# Patient Record
Sex: MALE | URBAN - METROPOLITAN AREA
[De-identification: names, ages, dates, MRNs, and addresses within clinical notes are randomized per-mention and may not be internally consistent; named-entity substitution may affect disease eponyms.]

---

## 2023-07-06 ENCOUNTER — OFFICE VISIT (OUTPATIENT)
Dept: URBAN - METROPOLITAN AREA CLINIC 74 | Facility: CLINIC | Age: 53
End: 2023-07-06
Payer: COMMERCIAL

## 2023-07-06 ENCOUNTER — LAB OUTSIDE AN ENCOUNTER (OUTPATIENT)
Dept: URBAN - METROPOLITAN AREA CLINIC 74 | Facility: CLINIC | Age: 53
End: 2023-07-06

## 2023-07-06 ENCOUNTER — WEB ENCOUNTER (OUTPATIENT)
Dept: URBAN - METROPOLITAN AREA CLINIC 74 | Facility: CLINIC | Age: 53
End: 2023-07-06

## 2023-07-06 VITALS
TEMPERATURE: 97.2 F | BODY MASS INDEX: 42.59 KG/M2 | SYSTOLIC BLOOD PRESSURE: 118 MMHG | HEIGHT: 66 IN | WEIGHT: 265 LBS | HEART RATE: 111 BPM | DIASTOLIC BLOOD PRESSURE: 80 MMHG

## 2023-07-06 DIAGNOSIS — K21.9 GERD: ICD-10-CM

## 2023-07-06 DIAGNOSIS — K31.9 GASTROPATHY: ICD-10-CM

## 2023-07-06 DIAGNOSIS — K44.9 HIATAL HERNIA: ICD-10-CM

## 2023-07-06 DIAGNOSIS — Z86.010 PERSONAL HISTORY OF COLONIC POLYPS: ICD-10-CM

## 2023-07-06 DIAGNOSIS — K57.30 SIGMOID DIVERTICULOSIS: ICD-10-CM

## 2023-07-06 DIAGNOSIS — K76.0 FATTY LIVER: ICD-10-CM

## 2023-07-06 DIAGNOSIS — E66.9 OBESITY (BMI 30-39.9): ICD-10-CM

## 2023-07-06 PROBLEM — 428283002: Status: ACTIVE | Noted: 2023-07-06

## 2023-07-06 PROBLEM — 197321007: Status: ACTIVE | Noted: 2023-07-06

## 2023-07-06 PROCEDURE — 99204 OFFICE O/P NEW MOD 45 MIN: CPT | Performed by: INTERNAL MEDICINE

## 2023-07-06 RX ORDER — FAMOTIDINE 20 MG/1
1 TABLET AT BEDTIME AS NEEDED TABLET, FILM COATED ORAL ONCE A DAY
Status: ACTIVE | COMMUNITY

## 2023-07-06 RX ORDER — AMLODIPINE BESYLATE 5 MG/1
TAKE 1 TABLET (5 MG) BY ORAL ROUTE ONCE DAILY TABLET ORAL 1
Qty: 0 | Refills: 0 | Status: ACTIVE | COMMUNITY
Start: 1900-01-01

## 2023-07-06 RX ORDER — LANSOPRAZOLE 30 MG/1
TAKE 1 CAPSULE (30 MG) BY ORAL ROUTE ONCE DAILY BEFORE A MEAL CAPSULE, DELAYED RELEASE ORAL 1
OUTPATIENT

## 2023-07-06 RX ORDER — LANSOPRAZOLE 30 MG/1
TAKE 1 CAPSULE (30 MG) BY ORAL ROUTE ONCE DAILY BEFORE A MEAL CAPSULE, DELAYED RELEASE ORAL 1
Qty: 0 | Refills: 0 | Status: ACTIVE | COMMUNITY
Start: 1900-01-01

## 2023-07-06 RX ORDER — LISINOPRIL 20 MG/1
TAKE 1 TABLET (20 MG) BY ORAL ROUTE ONCE DAILY TABLET ORAL 1
Qty: 0 | Refills: 0 | Status: ACTIVE | COMMUNITY
Start: 1900-01-01

## 2023-07-06 RX ORDER — FAMOTIDINE 20 MG/1
1 TABLET AT BEDTIME AS NEEDED TABLET, FILM COATED ORAL ONCE A DAY
OUTPATIENT

## 2023-07-06 NOTE — PHYSICAL EXAM CONSTITUTIONAL:
Morbidly obese, well-developed, well nourished, in no acute distress, ambulating without difficulty, normal communication ability

## 2023-07-06 NOTE — HPI-TODAY'S VISIT:
Today July 6, 2023 the patient returns for a visit, the patient was last seen on March 7, 2019 with a history of adenomatous colonic polyps, sigmoid diverticulosis, internal hemorrhoids, a hiatal hernia, gastropathy, obesity.  At the time of the visit the patient returned for a follow-up visit after a colonoscopy which revealed a 9 mm adenomatous sigmoid sessile polyp removed with a hot snare, sigmoid diverticulosis and internal hemorrhoids, the terminal ileum was normal.  The EGD revealed normal esophageal mucosa a small hiatal hernia, reactive gastropathy and normal duodenal mucosa.  At the time the patient had a C-reactive protein of 26.1, stools were not obtained.  The patient appeared to be doing better, he reported normal bowel movements and no abdominal pain, there was no nausea, vomiting, there was less heartburn on Prevacid 15 mg daily.  The patient was advised to follow antireflux measures and diet, ingest a high-fiber diet, lose weight, and return for a surveillance colonoscopy in 5-years.  A CT scan performed on January 7, 2019 was abnormal including pelvic and abdominal fluid, ileitis and right colitis.  At the time the patient had an abnormal CBC with leukocytosis.  During the last colonoscopy biopsies were obtained from the terminal ileum which failed to show any abnormalities, the colonic mucosa was normal besides the adenomatous polyp.  Today the patient returns to the office stating that he has continued to have chronic acid reflux over the years, over the years he made changes on his PPI from over-the-counter Prilosec to Prevacid and back, lately after seeing ENT for cough and some throat irritation he was placed on lansoprazole 30 mg daily and famotidine 20 mg at bedtime.  He seems to be doing better on the combination.  The patient denies having any dysphagia, odynophagia, nausea, vomiting.  The patient is concerned about having an enlarging hiatal hernia and esophageal changes related to acid reflux such as Alonso's.  The patient will be scheduled to have an EGD.  Benefits potential complications and alternatives to EGD were disclosed.  The patient will follow antireflux measures and diet.  Currently the patient is having normal bowel movements, denies having any diarrhea, constipation, rectal bleeding or hematochezia.  The patient requests to have a colonoscopy in view of the previous history of adenomatous colonic polyps.  The patient will be scheduled for the colonoscopy.  Benefits potential complications and alternatives to colonoscopy were disclosed.  The patient is morbidly obese, we discussed fatty liver and potential consequences related to fatty liver.  Recent laboratory performed by primary care revealed a normal CBC and normal CMP including normal LFTs.  The patient agreed to get a right upper quadrant ultrasound.  The patient will return for follow-up visit after completion of testing.

## 2023-07-18 ENCOUNTER — OFFICE VISIT (OUTPATIENT)
Dept: URBAN - METROPOLITAN AREA CLINIC 73 | Facility: CLINIC | Age: 53
End: 2023-07-18
Payer: COMMERCIAL

## 2023-07-18 DIAGNOSIS — E66.9 OBESITY (BMI 30-39.9): ICD-10-CM

## 2023-07-18 DIAGNOSIS — K76.0 FATTY LIVER: ICD-10-CM

## 2023-07-18 PROCEDURE — 76705 ECHO EXAM OF ABDOMEN: CPT | Performed by: INTERNAL MEDICINE

## 2023-07-23 ENCOUNTER — WEB ENCOUNTER (OUTPATIENT)
Dept: URBAN - METROPOLITAN AREA CLINIC 74 | Facility: CLINIC | Age: 53
End: 2023-07-23

## 2023-08-07 ENCOUNTER — OFFICE VISIT (OUTPATIENT)
Dept: URBAN - METROPOLITAN AREA SURGERY CENTER 30 | Facility: SURGERY CENTER | Age: 53
End: 2023-08-07

## 2023-08-15 ENCOUNTER — CLAIMS CREATED FROM THE CLAIM WINDOW (OUTPATIENT)
Dept: URBAN - METROPOLITAN AREA CLINIC 4 | Facility: CLINIC | Age: 53
End: 2023-08-15
Payer: COMMERCIAL

## 2023-08-15 ENCOUNTER — OFFICE VISIT (OUTPATIENT)
Dept: URBAN - METROPOLITAN AREA SURGERY CENTER 30 | Facility: SURGERY CENTER | Age: 53
End: 2023-08-15
Payer: COMMERCIAL

## 2023-08-15 DIAGNOSIS — K21.9 GASTRO-ESOPHAGEAL REFLUX DISEASE WITHOUT ESOPHAGITIS: ICD-10-CM

## 2023-08-15 DIAGNOSIS — K21.9 ACID REFLUX: ICD-10-CM

## 2023-08-15 DIAGNOSIS — K29.60 ADENOPAPILLOMATOSIS GASTRICA: ICD-10-CM

## 2023-08-15 DIAGNOSIS — Z86.010 ADENOMAS PERSONAL HISTORY OF COLONIC POLYPS: ICD-10-CM

## 2023-08-15 DIAGNOSIS — K31.A0 GASTRIC INTESTINAL METAPLASIA, UNSPECIFIED: ICD-10-CM

## 2023-08-15 DIAGNOSIS — K63.5 BENIGN COLON POLYP: ICD-10-CM

## 2023-08-15 DIAGNOSIS — K22.2 ACQUIRED ESOPHAGEAL RING: ICD-10-CM

## 2023-08-15 PROCEDURE — G8907 PT DOC NO EVENTS ON DISCHARG: HCPCS | Performed by: INTERNAL MEDICINE

## 2023-08-15 PROCEDURE — 88312 SPECIAL STAINS GROUP 1: CPT | Performed by: PATHOLOGY

## 2023-08-15 PROCEDURE — 45385 COLONOSCOPY W/LESION REMOVAL: CPT | Performed by: INTERNAL MEDICINE

## 2023-08-15 PROCEDURE — 88305 TISSUE EXAM BY PATHOLOGIST: CPT | Performed by: PATHOLOGY

## 2023-08-15 PROCEDURE — 88342 IMHCHEM/IMCYTCHM 1ST ANTB: CPT | Performed by: PATHOLOGY

## 2023-08-15 PROCEDURE — 43239 EGD BIOPSY SINGLE/MULTIPLE: CPT | Performed by: INTERNAL MEDICINE

## 2023-08-15 RX ORDER — AMLODIPINE BESYLATE 5 MG/1
TAKE 1 TABLET (5 MG) BY ORAL ROUTE ONCE DAILY TABLET ORAL 1
Qty: 0 | Refills: 0 | Status: ACTIVE | COMMUNITY
Start: 1900-01-01

## 2023-08-15 RX ORDER — LANSOPRAZOLE 30 MG/1
TAKE 1 CAPSULE (30 MG) BY ORAL ROUTE ONCE DAILY BEFORE A MEAL CAPSULE, DELAYED RELEASE ORAL 1
Status: ACTIVE | COMMUNITY

## 2023-08-15 RX ORDER — FAMOTIDINE 20 MG/1
1 TABLET AT BEDTIME AS NEEDED TABLET, FILM COATED ORAL ONCE A DAY
Status: ACTIVE | COMMUNITY

## 2023-08-15 RX ORDER — LISINOPRIL 20 MG/1
TAKE 1 TABLET (20 MG) BY ORAL ROUTE ONCE DAILY TABLET ORAL 1
Qty: 0 | Refills: 0 | Status: ACTIVE | COMMUNITY
Start: 1900-01-01

## 2023-09-13 PROBLEM — 733657002: Status: ACTIVE | Noted: 2023-09-13

## 2023-09-20 ENCOUNTER — OFFICE VISIT (OUTPATIENT)
Dept: URBAN - METROPOLITAN AREA CLINIC 74 | Facility: CLINIC | Age: 53
End: 2023-09-20

## 2023-09-20 RX ORDER — LISINOPRIL 20 MG/1
TAKE 1 TABLET (20 MG) BY ORAL ROUTE ONCE DAILY TABLET ORAL 1
Qty: 0 | Refills: 0 | Status: ACTIVE | COMMUNITY
Start: 1900-01-01

## 2023-09-20 RX ORDER — FAMOTIDINE 20 MG/1
1 TABLET AT BEDTIME AS NEEDED TABLET, FILM COATED ORAL ONCE A DAY
OUTPATIENT

## 2023-09-20 RX ORDER — FAMOTIDINE 20 MG/1
1 TABLET AT BEDTIME AS NEEDED TABLET, FILM COATED ORAL ONCE A DAY
Status: ACTIVE | COMMUNITY

## 2023-09-20 RX ORDER — LANSOPRAZOLE 30 MG/1
TAKE 1 CAPSULE (30 MG) BY ORAL ROUTE ONCE DAILY BEFORE A MEAL CAPSULE, DELAYED RELEASE ORAL 1
OUTPATIENT

## 2023-09-20 RX ORDER — LANSOPRAZOLE 30 MG/1
TAKE 1 CAPSULE (30 MG) BY ORAL ROUTE ONCE DAILY BEFORE A MEAL CAPSULE, DELAYED RELEASE ORAL 1
Status: ACTIVE | COMMUNITY

## 2023-09-20 RX ORDER — AMLODIPINE BESYLATE 5 MG/1
TAKE 1 TABLET (5 MG) BY ORAL ROUTE ONCE DAILY TABLET ORAL 1
Qty: 0 | Refills: 0 | Status: ACTIVE | COMMUNITY
Start: 1900-01-01

## 2023-09-20 NOTE — HPI-TODAY'S VISIT:
Today July 6, 2023 the patient returns for a visit, the patient was last seen on March 7, 2019 with a history of adenomatous colonic polyps, sigmoid diverticulosis, internal hemorrhoids, a hiatal hernia, gastropathy, obesity.  At the time of the visit the patient returned for a follow-up visit after a colonoscopy which revealed a 9 mm adenomatous sigmoid sessile polyp removed with a hot snare, sigmoid diverticulosis and internal hemorrhoids, the terminal ileum was normal.  The EGD revealed normal esophageal mucosa a small hiatal hernia, reactive gastropathy and normal duodenal mucosa.  At the time the patient had a C-reactive protein of 26.1, stools were not obtained.  The patient appeared to be doing better, he reported normal bowel movements and no abdominal pain, there was no nausea, vomiting, there was less heartburn on Prevacid 15 mg daily.  The patient was advised to follow antireflux measures and diet, ingest a high-fiber diet, lose weight, and return for a surveillance colonoscopy in 5-years.  A CT scan performed on January 7, 2019 was abnormal including pelvic and abdominal fluid, ileitis and right colitis.  At the time the patient had an abnormal CBC with leukocytosis.  During the last colonoscopy biopsies were obtained from the terminal ileum which failed to show any abnormalities, the colonic mucosa was normal besides the adenomatous polyp.  Today the patient returns to the office stating that he has continued to have chronic acid reflux over the years, over the years he made changes on his PPI from over-the-counter Prilosec to Prevacid and back, lately after seeing ENT for cough and some throat irritation he was placed on lansoprazole 30 mg daily and famotidine 20 mg at bedtime.  He seems to be doing better on the combination.  The patient denies having any dysphagia, odynophagia, nausea, vomiting.  The patient is concerned about having an enlarging hiatal hernia and esophageal changes related to acid reflux such as Alonso's.  The patient will be scheduled to have an EGD.  Benefits potential complications and alternatives to EGD were disclosed.  The patient will follow antireflux measures and diet.  Currently the patient is having normal bowel movements, denies having any diarrhea, constipation, rectal bleeding or hematochezia.  The patient requests to have a colonoscopy in view of the previous history of adenomatous colonic polyps.  The patient will be scheduled for the colonoscopy.  Benefits potential complications and alternatives to colonoscopy were disclosed.  The patient is morbidly obese, we discussed fatty liver and potential consequences related to fatty liver.  Recent laboratory performed by primary care revealed a normal CBC and normal CMP including normal LFTs.  The patient agreed to get a right upper quadrant ultrasound.  The patient will return for follow-up visit after completion of testing.  Today September 20, 2023 the patient returns for a follow-up visit, the patient was last seen on July 6, 2023 with a personal history of colonic polyps, sigmoid diverticulosis, GERD, gastropathy, hiatal hernia, fatty liver and obesity.  At the time of the visit the patient did complain of chronic acid reflux, reflux has been present for a long.  May be years.  The patient made changes on PPIs from over-the-counter Prilosec to Prevacid and back, the patient had been seen by ENT for cough and some throat irritation and was placed on lansoprazole 30 mg daily and famotidine 20 mg at bedtime.  On the combination the patient appeared to be doing better.  The patient denied having any dysphagia, odynophagia, nausea or vomiting.  The patient was concerned about having an enlarging hiatal hernia and esophageal changes related to acid reflux such as Alonso's.  The patient requested to have an EGD.  Patient was advised to follow antireflux measures and diet.  The patient was having normal bowel movements denied having diarrhea constipation rectal bleeding or hematochezia.  The patient also requested to have a colonoscopy in view of the previous history of adenomatous colonic polyps.  The patient was morbidly obese, we discussed fatty liver and potential consequences related to fatty liver including cirrhosis and liver cancer.  Laboratory obtained by primary care revealed a normal CBC and normal CMP including normal LFTs.  The patient agreed to get a right upper quadrant ultrasound.  The right upper quadrant ultrasound was performed July 18, 2023 and it revealed increased liver echogenicity compatible with fatty liver, no focal hepatic lesions identified.  There was no evidence of any gallstones or gallbladder wall thickening, the common bile duct measured 3 mm.  The portion of the pancreas visualized was normal.  The right kidney was normal.  The EGD was performed on August 15, 2023 and it revealed a medium sized hiatal hernia, widely patent Schatzki's ring, chronic inactive gastritis with histological changes suggestive of treated H. pylori gastritis, no evidence of intestinal metaplasia dysplasia or malignancy, squamous esophageal mucosa revealed reflux type changes with no evidence of Alonso's.  The patient will be recommended to get an H. pylori breath test of PPIs.  A colonoscopy performed on the same day revealed a 5 mm mucosal polyp in the sigmoid colon removed with a hot snare, colonic diverticulosis involving the sigmoid and descending as well as ascending colon and nonbleeding internal hemorrhoids.  The patient was advised to get a colonoscopy in 5 years and follow a high-fiber diet.

## 2023-09-26 ENCOUNTER — DASHBOARD ENCOUNTERS (OUTPATIENT)
Age: 53
End: 2023-09-26

## 2023-09-28 ENCOUNTER — OFFICE VISIT (OUTPATIENT)
Dept: URBAN - METROPOLITAN AREA CLINIC 74 | Facility: CLINIC | Age: 53
End: 2023-09-28
Payer: COMMERCIAL

## 2023-09-28 VITALS
HEIGHT: 66 IN | BODY MASS INDEX: 41.95 KG/M2 | WEIGHT: 261 LBS | DIASTOLIC BLOOD PRESSURE: 82 MMHG | OXYGEN SATURATION: 97 % | SYSTOLIC BLOOD PRESSURE: 124 MMHG | TEMPERATURE: 97.5 F | HEART RATE: 101 BPM

## 2023-09-28 DIAGNOSIS — K57.30 COLON, DIVERTICULOSIS: ICD-10-CM

## 2023-09-28 DIAGNOSIS — Z86.010 PERSONAL HISTORY OF COLONIC POLYPS: ICD-10-CM

## 2023-09-28 DIAGNOSIS — K31.9 GASTROPATHY: ICD-10-CM

## 2023-09-28 DIAGNOSIS — K21.9 GERD: ICD-10-CM

## 2023-09-28 DIAGNOSIS — K44.9 HIATAL HERNIA: ICD-10-CM

## 2023-09-28 DIAGNOSIS — K76.0 FATTY LIVER: ICD-10-CM

## 2023-09-28 DIAGNOSIS — K64.8 INTERNAL HEMORRHOIDS: ICD-10-CM

## 2023-09-28 DIAGNOSIS — E66.9 OBESITY (BMI 30-39.9): ICD-10-CM

## 2023-09-28 PROCEDURE — 99213 OFFICE O/P EST LOW 20 MIN: CPT | Performed by: INTERNAL MEDICINE

## 2023-09-28 PROCEDURE — 91065 BREATH HYDROGEN/METHANE TEST: CPT | Performed by: INTERNAL MEDICINE

## 2023-09-28 RX ORDER — LISINOPRIL 20 MG/1
TAKE 1 TABLET (20 MG) BY ORAL ROUTE ONCE DAILY TABLET ORAL 1
Qty: 0 | Refills: 0 | Status: ACTIVE | COMMUNITY
Start: 1900-01-01

## 2023-09-28 RX ORDER — AMLODIPINE BESYLATE 5 MG/1
TAKE 1 TABLET (5 MG) BY ORAL ROUTE ONCE DAILY TABLET ORAL 1
Qty: 0 | Refills: 0 | Status: ACTIVE | COMMUNITY
Start: 1900-01-01

## 2023-09-28 RX ORDER — ATORVASTATIN CALCIUM 10 MG/1
TABLET ORAL
Qty: 90 TABLET | Status: ACTIVE | COMMUNITY

## 2023-09-28 RX ORDER — LANSOPRAZOLE 30 MG/1
TAKE 1 CAPSULE (30 MG) BY ORAL ROUTE ONCE DAILY BEFORE A MEAL CAPSULE, DELAYED RELEASE ORAL 1
Status: ACTIVE | COMMUNITY

## 2023-09-28 RX ORDER — FAMOTIDINE 20 MG/1
1 TABLET AT BEDTIME AS NEEDED TABLET, FILM COATED ORAL ONCE A DAY
Status: ACTIVE | COMMUNITY

## 2023-09-28 NOTE — HPI-TODAY'S VISIT:
Today July 6, 2023 the patient returns for a visit, the patient was last seen on March 7, 2019 with a history of adenomatous colonic polyps, sigmoid diverticulosis, internal hemorrhoids, a hiatal hernia, gastropathy, obesity.  At the time of the visit the patient returned for a follow-up visit after a colonoscopy which revealed a 9 mm adenomatous sigmoid sessile polyp removed with a hot snare, sigmoid diverticulosis and internal hemorrhoids, the terminal ileum was normal.  The EGD revealed normal esophageal mucosa a small hiatal hernia, reactive gastropathy and normal duodenal mucosa.  At the time the patient had a C-reactive protein of 26.1, stools were not obtained.  The patient appeared to be doing better, he reported normal bowel movements and no abdominal pain, there was no nausea, vomiting, there was less heartburn on Prevacid 15 mg daily.  The patient was advised to follow antireflux measures and diet, ingest a high-fiber diet, lose weight, and return for a surveillance colonoscopy in 5-years.  A CT scan performed on January 7, 2019 was abnormal including pelvic and abdominal fluid, ileitis and right colitis.  At the time the patient had an abnormal CBC with leukocytosis.  During the last colonoscopy biopsies were obtained from the terminal ileum which failed to show any abnormalities, the colonic mucosa was normal besides the adenomatous polyp.  Today the patient returns to the office stating that he has continued to have chronic acid reflux over the years, over the years he made changes on his PPI from over-the-counter Prilosec to Prevacid and back, lately after seeing ENT for cough and some throat irritation he was placed on lansoprazole 30 mg daily and famotidine 20 mg at bedtime.  He seems to be doing better on the combination.  The patient denies having any dysphagia, odynophagia, nausea, vomiting.  The patient is concerned about having an enlarging hiatal hernia and esophageal changes related to acid reflux such as Alonso's.  The patient will be scheduled to have an EGD.  Benefits potential complications and alternatives to EGD were disclosed.  The patient will follow antireflux measures and diet.  Currently the patient is having normal bowel movements, denies having any diarrhea, constipation, rectal bleeding or hematochezia.  The patient requests to have a colonoscopy in view of the previous history of adenomatous colonic polyps.  The patient will be scheduled for the colonoscopy.  Benefits potential complications and alternatives to colonoscopy were disclosed.  The patient is morbidly obese, we discussed fatty liver and potential consequences related to fatty liver.  Recent laboratory performed by primary care revealed a normal CBC and normal CMP including normal LFTs.  The patient agreed to get a right upper quadrant ultrasound.  The patient will return for follow-up visit after completion of testing.   Today September 28 , 2023 the patient returns for a follow-up visit, the patient was last seen on July 6, 2023 with a personal history of colonic polyps, sigmoid diverticulosis, GERD, gastropathy, hiatal hernia, fatty liver and obesity.  At the time of the visit the patient did complain of chronic acid reflux, reflux has been present for a long.  May be years.  The patient made changes on PPIs from over-the-counter Prilosec to Prevacid and back, the patient had been seen by ENT for cough and some throat irritation and was placed on lansoprazole 30 mg daily and famotidine 20 mg at bedtime.  On the combination the patient appeared to be doing better.  The patient denied having any dysphagia, odynophagia, nausea or vomiting.  The patient was concerned about having an enlarging hiatal hernia and esophageal changes related to acid reflux such as Alonso's.  The patient requested to have an EGD.  Patient was advised to follow antireflux measures and diet.  The patient was having normal bowel movements denied having diarrhea constipation rectal bleeding or hematochezia.  The patient also requested to have a colonoscopy in view of the previous history of adenomatous colonic polyps.  The patient was morbidly obese, we discussed fatty liver and potential consequences related to fatty liver including cirrhosis and liver cancer.  Laboratory obtained by primary care revealed a normal CBC and normal CMP including normal LFTs.  The patient agreed to get a right upper quadrant ultrasound.  The right upper quadrant ultrasound was performed July 18, 2023 and it revealed increased liver echogenicity compatible with fatty liver, no focal hepatic lesions identified.  There was no evidence of any gallstones or gallbladder wall thickening, the common bile duct measured 3 mm.  The portion of the pancreas visualized was normal.  The right kidney was normal.  The EGD was performed on August 15, 2023 and it revealed a medium sized hiatal hernia, widely patent Schatzki's ring, chronic inactive gastritis with histological changes suggestive of treated H. pylori gastritis, no evidence of intestinal metaplasia dysplasia or malignancy, squamous esophageal mucosa revealed reflux type changes with no evidence of Alonso's.  The patient will be recommended to get an H. pylori breath test of PPIs.  A colonoscopy performed on the same day revealed a 5 mm mucosal polyp in the sigmoid colon removed with a hot snare, colonic diverticulosis involving the sigmoid and descending as well as ascending colon and nonbleeding internal hemorrhoids.  The patient was advised to get a colonoscopy in 5 years and follow a high-fiber diet.  Today the patient returns to the office stating that he is doing well, denies having any dysphagia, odynophagia, the heartburn is under control as long as he takes famotidine 20 mg at bedtime and lansoprazole 30 mg in the a.m.  The patient has been instructed to follow antireflux measures and diet.  I discussed with the patient the recent EGD findings which revealed a medium sized hiatal hernia, and widely patent Schatzki's ring, changes suggestive of gastritis with histological changes suggestive of treated H. pylori, there was no intestinal metaplasia dysplasia or malignancy, the patient has no evidence of Alonso's esophagus.  The patient agreed to get an H. pylori breath test of PPIs for 4 weeks, he will continue to use famotidine twice a day and follow the antireflux measures and diet.  I also discussed with the patient the recent colonoscopy which revealed diverticulosis and internal hemorrhoids.  No polyps were identified.  The patient was advised to follow high-fiber diet and have a colonoscopy in 5 years.  The patient has been made aware that he has fatty liver as per the ultrasound and agree to lose weight.  The patient will return for a follow-up visit after we complete the next portion of his evaluation.

## 2023-11-16 LAB — H PYLORI BREATH TEST: NOT DETECTED
